# Patient Record
Sex: FEMALE | Race: BLACK OR AFRICAN AMERICAN | URBAN - METROPOLITAN AREA
[De-identification: names, ages, dates, MRNs, and addresses within clinical notes are randomized per-mention and may not be internally consistent; named-entity substitution may affect disease eponyms.]

---

## 2024-04-04 ENCOUNTER — TELEPHONE (OUTPATIENT)
Dept: TRANSPLANT | Facility: HOSPITAL | Age: 44
End: 2024-04-04
Payer: COMMERCIAL

## 2024-04-04 ENCOUNTER — DOCUMENTATION (OUTPATIENT)
Dept: TRANSPLANT | Facility: HOSPITAL | Age: 44
End: 2024-04-04
Payer: COMMERCIAL

## 2024-04-04 VITALS — WEIGHT: 162.04 LBS | BODY MASS INDEX: 27.66 KG/M2 | HEIGHT: 64 IN

## 2024-04-04 DIAGNOSIS — N18.6 ESRD (END STAGE RENAL DISEASE) (MULTI): Primary | ICD-10-CM

## 2024-04-04 NOTE — PROGRESS NOTES
Do you have difficulty reading or writing in English?  Yes, Reading(Pt.Legally Blind)  What is the primary cause of your kidney disease?  Diabetes  Are you currently on dialysis?   yes  If yes, what days do you have your dialysis treatments?  M, W, F  Have you received a transplant before?   no  If yes, what organ, and when and where was your transplant?     Have you been diagnosed with diabetes?    yes, not being treated.  Have you tested positive for hepatitis or HIV?   no  Have you ever been diagnosed with cancer?   no  If yes, what type of cancer, and when and where were you treated?     Do you have a history of a heart attack or stroke?   Yes, heart attack-2013, treated at [facility]  Are you currently or have you previously been seen by a mental health professional?   no  If yes, what is the name of your mental health provider?     Are you a current or former tobacco user?   yes, Quit 2018  Do you have history of alcohol abuse or dependence?   no  Do you have a history of illegal drug abuse or dependence?   no  Has anyone told you they're willing to donate their kidney to you?   yes  Comments:  Intake complete, scheduled .  Records being requested.

## 2024-05-20 ENCOUNTER — DOCUMENTATION (OUTPATIENT)
Dept: TRANSPLANT | Facility: HOSPITAL | Age: 44
End: 2024-05-20
Payer: COMMERCIAL

## 2024-05-20 NOTE — PROGRESS NOTES
Plan is [facility]  (Medicare-Medicaid Plan) out of area plan from [facility] in [place].

## 2024-05-22 ENCOUNTER — DOCUMENTATION (OUTPATIENT)
Dept: TRANSPLANT | Facility: HOSPITAL | Age: 44
End: 2024-05-22
Payer: COMMERCIAL

## 2024-05-23 ENCOUNTER — DOCUMENTATION (OUTPATIENT)
Dept: TRANSPLANT | Facility: HOSPITAL | Age: 44
End: 2024-05-23
Payer: COMMERCIAL

## 2024-05-23 ENCOUNTER — HOSPITAL ENCOUNTER (INPATIENT)
Age: 44
End: 2024-05-23
Attending: SURGERY | Admitting: SURGERY
Payer: COMMERCIAL

## 2024-05-23 NOTE — PROGRESS NOTES
Per [facility] O active.  Not able to verify Medicaid portion.  Will need to verify pts name on Medicaid card.  Listing approval will be needed.

## 2024-05-24 ENCOUNTER — DOCUMENTATION (OUTPATIENT)
Dept: TRANSPLANT | Facility: HOSPITAL | Age: 44
End: 2024-05-24

## 2024-05-24 DIAGNOSIS — Z13.6 ENCOUNTER FOR SCREENING FOR CARDIOVASCULAR DISORDERS: ICD-10-CM

## 2024-05-24 DIAGNOSIS — N18.6 END STAGE RENAL DISEASE (MULTI): ICD-10-CM

## 2024-05-24 DIAGNOSIS — N18.9 CHRONIC KIDNEY DISEASE, UNSPECIFIED CKD STAGE: ICD-10-CM

## 2024-05-24 DIAGNOSIS — Z79.899 OTHER LONG TERM (CURRENT) DRUG THERAPY: ICD-10-CM

## 2024-05-24 DIAGNOSIS — Z01.818 PRE-TRANSPLANT EVALUATION FOR KIDNEY TRANSPLANT: ICD-10-CM

## 2024-05-24 DIAGNOSIS — Z51.81 ENCOUNTER FOR THERAPEUTIC DRUG LEVEL MONITORING: ICD-10-CM

## 2024-05-24 NOTE — PROGRESS NOTES
Eval Clinic Note:  Patient attended evaluation appts on *** with  *** and  ***  Medications and allergies reviewed with patient.  Patient presented to appointment with ***.  Patient ambulated independently/used a wheelchair/cane***.    History:  Patient has a history of   Dialysis: *** on T-Th-Sat OR M-W-F, ACCESS ***, since ***.   PREDIALYSIS  ***.  Testing completed recently:   Testing needed for evaluation:   Listing Consent: ***KDPI >85%, DCD, Hep C, Hep B***  Comments:  Provided patient with my contact info for questions and concerns.      LISTING EDUCATION    Patient educated regarding the following prior to placement on the transplant waiting list:   The patient?s medical condition, prognosis, and treatment plan.   The expectations and patient responsibilities while on the waiting list, including:   Keeping the transplant center informed of any changes in contact information or insurance coverage   Notifying the transplant center of any changes in medical status   Required testing and/or re-evaluation appointments while awaiting transplant   An overview of the surgical procedure, including potential risks and alternatives.   Information regarding what to expect during the inpatient admission and recovery period.   A discussion regarding organ offers and types of potential donors, including potential risks that may be associated with specific types of donors that could affect the success of the transplant or the health of the patient.  The right to refuse transplantation.     Patient was given the opportunity to have questions answered. Patient was provided a copy of the informed consent for transplant listing.    Education provided by:  Transplant Coordinator: ***  Transplant Physician: ***    Signed listing informed consent received? YES/NO  Patient agrees to be listed for the following:  KDPI > 85% [***]  Donors After Circulatory Death (DCD) [***]  Donors with a Positive Core Antibody for Hepatitis B  [***]  Donors with Hepatitis C Virus to recipients with hepatitis C [***]  Donors with Hepatitis C Virus to Negative hepatitis C recipients [***]    Patient will be discussed at an upcoming selection committee to determine eligibility to be placed on the [facility] waiting list.

## 2024-05-24 NOTE — PROGRESS NOTES
Eval Clinic Note:  Patient attended evaluation appts on 2024 with Dr. Lewis and Dr. Jimenez. Medications and allergies reviewed with patient.  Patient presented to appointment with her significant other.  Patient ambulated independently.  History:  Patient has a history of nonhealing nonsurgical wound, PAD, MI, MONO, blindness, , obesity, T2DM  Dialysis:  [facility] ([place])  Testing completed recently: Mammogram 2024 (need records)  Testing needed for evaluation:  CT A/P non-contrast, TISHA/TBI (start with these two), CAC, echo, EKG, pap, pelvis ultrasound, colonoscopy, renasight (will need to be mailed), virtual finance, dietician, cardiology risk stratification  Listing Consent:  KDPI >85%, DCD, Hep C, Hep B - if immunce  Comments:  Provided patient with my contact info for questions and concerns.      Dr. Marion ([place]), vascular doctor, Dr. Syed ([place]), Dr. Garima Alfonso ([facility])    *Patient lives in [place]. Per Dr. Aaron, start with an TISHA/TBI and CT A/P non-contrast and then order the rest of the testing.        LISTING EDUCATION    Patient educated regarding the following prior to placement on the transplant waiting list:   The patient's medical condition, prognosis, and treatment plan.   The expectations and patient responsibilities while on the waiting list, including:   Keeping the transplant center informed of any changes in contact information or insurance coverage   Notifying the transplant center of any changes in medical status   Required testing and/or re-evaluation appointments while awaiting transplant   An overview of the surgical procedure, including potential risks and alternatives.   Information regarding what to expect during the inpatient admission and recovery period.   A discussion regarding organ offers and types of potential donors, including potential risks that may be associated with specific types of donors that could affect the success of the  transplant or the health of the patient.  The right to refuse transplantation.     Patient was given the opportunity to have questions answered. Patient was provided a copy of the informed consent for transplant listing.    Education provided by:  Transplant Coordinator: Hallie Valdez RN   Transplant Physician:  Zack Jimenez MD      Signed listing informed consent received? YES/NO  Patient agrees to be listed for the following:  KDPI > 85% [X]  Donors After Circulatory Death (DCD) [X]  Donors with a Positive Core Antibody for Hepatitis B - if immune [X]  Donors with Hepatitis C Virus to recipients with hepatitis C []  Donors with Hepatitis C Virus to Negative hepatitis C recipients [X]    Patient will be discussed at an upcoming selection committee to determine eligibility to be placed on the [facility] waiting list.

## 2024-05-28 ENCOUNTER — OFFICE VISIT (OUTPATIENT)
Dept: TRANSPLANT | Facility: HOSPITAL | Age: 44
End: 2024-05-28
Payer: COMMERCIAL

## 2024-05-28 ENCOUNTER — APPOINTMENT (OUTPATIENT)
Dept: TRANSPLANT | Facility: HOSPITAL | Age: 44
End: 2024-05-28
Payer: COMMERCIAL

## 2024-05-28 ENCOUNTER — LAB (OUTPATIENT)
Dept: LAB | Facility: LAB | Age: 44
End: 2024-05-28
Payer: COMMERCIAL

## 2024-05-28 ENCOUNTER — LAB REQUISITION (OUTPATIENT)
Dept: LAB | Facility: CLINIC | Age: 44
End: 2024-05-28
Payer: COMMERCIAL

## 2024-05-28 ENCOUNTER — SOCIAL WORK (OUTPATIENT)
Dept: TRANSPLANT | Facility: HOSPITAL | Age: 44
End: 2024-05-28
Payer: COMMERCIAL

## 2024-05-28 VITALS
HEART RATE: 83 BPM | OXYGEN SATURATION: 100 % | WEIGHT: 159.8 LBS | SYSTOLIC BLOOD PRESSURE: 124 MMHG | BODY MASS INDEX: 27.28 KG/M2 | TEMPERATURE: 98 F | DIASTOLIC BLOOD PRESSURE: 84 MMHG

## 2024-05-28 VITALS
DIASTOLIC BLOOD PRESSURE: 84 MMHG | HEART RATE: 83 BPM | HEIGHT: 65 IN | OXYGEN SATURATION: 100 % | WEIGHT: 159.8 LBS | SYSTOLIC BLOOD PRESSURE: 124 MMHG | TEMPERATURE: 98 F | BODY MASS INDEX: 26.62 KG/M2

## 2024-05-28 DIAGNOSIS — Z01.818 PRE-TRANSPLANT EVALUATION FOR KIDNEY TRANSPLANT: ICD-10-CM

## 2024-05-28 DIAGNOSIS — N18.6 END STAGE RENAL DISEASE (MULTI): ICD-10-CM

## 2024-05-28 DIAGNOSIS — N18.6 ESRD NEEDING DIALYSIS (MULTI): Primary | ICD-10-CM

## 2024-05-28 DIAGNOSIS — Z13.6 ENCOUNTER FOR SCREENING FOR CARDIOVASCULAR DISORDERS: ICD-10-CM

## 2024-05-28 DIAGNOSIS — N18.9 CHRONIC KIDNEY DISEASE, UNSPECIFIED CKD STAGE: ICD-10-CM

## 2024-05-28 DIAGNOSIS — N18.6 ESRD (END STAGE RENAL DISEASE) (MULTI): Primary | ICD-10-CM

## 2024-05-28 DIAGNOSIS — Z51.81 ENCOUNTER FOR THERAPEUTIC DRUG LEVEL MONITORING: ICD-10-CM

## 2024-05-28 DIAGNOSIS — Z79.899 OTHER LONG TERM (CURRENT) DRUG THERAPY: ICD-10-CM

## 2024-05-28 DIAGNOSIS — Z99.2 ESRD NEEDING DIALYSIS (MULTI): Primary | ICD-10-CM

## 2024-05-28 LAB
ABO GROUP (TYPE) IN BLOOD: NORMAL
ALBUMIN SERPL BCP-MCNC: 4.5 G/DL (ref 3.4–5)
ALP SERPL-CCNC: 78 U/L (ref 33–110)
ALT SERPL W P-5'-P-CCNC: 9 U/L (ref 7–45)
AMYLASE SERPL-CCNC: 102 U/L (ref 29–103)
AST SERPL W P-5'-P-CCNC: 12 U/L (ref 9–39)
BILIRUB DIRECT SERPL-MCNC: 0.1 MG/DL (ref 0–0.3)
BILIRUB SERPL-MCNC: 0.4 MG/DL (ref 0–1.2)
BUN SERPL-MCNC: 36 MG/DL (ref 6–23)
C PEPTIDE SERPL-MCNC: 8.5 NG/ML (ref 0.7–3.9)
CHOLEST SERPL-MCNC: 138 MG/DL (ref 0–199)
CHOLESTEROL/HDL RATIO: 2.7
CMV IGG AVIDITY SERPL IA-RTO: REACTIVE %
CREAT SERPL-MCNC: 7.75 MG/DL (ref 0.5–1.05)
EBV EA IGG SER QL: NEGATIVE
EBV NA AB SER QL: POSITIVE
EBV VCA IGG SER IA-ACNC: POSITIVE
EBV VCA IGM SER IA-ACNC: NEGATIVE
EGFRCR SERPLBLD CKD-EPI 2021: 6 ML/MIN/1.73M*2
ERYTHROCYTE [DISTWIDTH] IN BLOOD BY AUTOMATED COUNT: 14.7 % (ref 11.5–14.5)
EST. AVERAGE GLUCOSE BLD GHB EST-MCNC: 94 MG/DL
FLOW AUTOCROSSMATCH: NORMAL
HBA1C MFR BLD: 4.9 %
HBV CORE AB SER QL: NONREACTIVE
HBV SURFACE AB SER-ACNC: >1000 MIU/ML
HBV SURFACE AG SERPL QL IA: NONREACTIVE
HCT VFR BLD AUTO: 38.5 % (ref 36–46)
HCV AB SER QL: NONREACTIVE
HCYS SERPL-SCNC: 35.57 UMOL/L (ref 5–13.9)
HDLC SERPL-MCNC: 51.2 MG/DL
HGB BLD-MCNC: 12.3 G/DL (ref 12–16)
HIV 1+2 AB+HIV1 P24 AG SERPL QL IA: NONREACTIVE
HLA CLASS I AB SCREEN,FC: NORMAL
HLA CLASS II AB SCREEN,FC: NORMAL
HLA CLS I TYP PNL BLD/T DONR HIGH RES: NORMAL
HLA RESULTS: NORMAL
HLA-DP2 QL: NORMAL
HLA-DQB1 HIGH RES: NORMAL
HLA-DRB1 HIGH RES: NORMAL
INR PPP: 1 (ref 0.9–1.1)
MCH RBC QN AUTO: 30.3 PG (ref 26–34)
MCHC RBC AUTO-ENTMCNC: 31.9 G/DL (ref 32–36)
MCV RBC AUTO: 95 FL (ref 80–100)
NON-HDL CHOLESTEROL: 87 MG/DL (ref 0–149)
NRBC BLD-RTO: 0 /100 WBCS (ref 0–0)
PHOSPHATE SERPL-MCNC: 4.9 MG/DL (ref 2.5–4.9)
PLATELET # BLD AUTO: 120 X10*3/UL (ref 150–450)
PROT SERPL-MCNC: 8.9 G/DL (ref 6.4–8.2)
PROTHROMBIN TIME: 10.9 SECONDS (ref 9.8–12.8)
RBC # BLD AUTO: 4.06 X10*6/UL (ref 4–5.2)
RH FACTOR (ANTIGEN D): NORMAL
TREPONEMA PALLIDUM IGG+IGM AB [PRESENCE] IN SERUM OR PLASMA BY IMMUNOASSAY: REACTIVE
VARICELLA ZOSTER IGG INDEX: 6.2 IA
VZV IGG SER QL IA: POSITIVE
WBC # BLD AUTO: 10.2 X10*3/UL (ref 4.4–11.3)

## 2024-05-28 PROCEDURE — 87340 HEPATITIS B SURFACE AG IA: CPT

## 2024-05-28 PROCEDURE — 85610 PROTHROMBIN TIME: CPT

## 2024-05-28 PROCEDURE — 87389 HIV-1 AG W/HIV-1&-2 AB AG IA: CPT

## 2024-05-28 PROCEDURE — 86780 TREPONEMA PALLIDUM: CPT

## 2024-05-28 PROCEDURE — 86318 IA INFECTIOUS AGENT ANTIBODY: CPT

## 2024-05-28 PROCEDURE — 82465 ASSAY BLD/SERUM CHOLESTEROL: CPT

## 2024-05-28 PROCEDURE — 80323 ALKALOIDS NOS: CPT

## 2024-05-28 PROCEDURE — 80349 CANNABINOIDS NATURAL: CPT

## 2024-05-28 PROCEDURE — 86706 HEP B SURFACE ANTIBODY: CPT

## 2024-05-28 PROCEDURE — 86803 HEPATITIS C AB TEST: CPT

## 2024-05-28 PROCEDURE — 84520 ASSAY OF UREA NITROGEN: CPT

## 2024-05-28 PROCEDURE — 86825 HLA X-MATH NON-CYTOTOXIC: CPT | Mod: OUT | Performed by: SURGERY

## 2024-05-28 PROCEDURE — 99213 OFFICE O/P EST LOW 20 MIN: CPT | Performed by: STUDENT IN AN ORGANIZED HEALTH CARE EDUCATION/TRAINING PROGRAM

## 2024-05-28 PROCEDURE — 86787 VARICELLA-ZOSTER ANTIBODY: CPT

## 2024-05-28 PROCEDURE — 86900 BLOOD TYPING SEROLOGIC ABO: CPT

## 2024-05-28 PROCEDURE — 81382 HLA II TYPING 1 LOC HR: CPT | Mod: 59,OUT | Performed by: SURGERY

## 2024-05-28 PROCEDURE — 82150 ASSAY OF AMYLASE: CPT

## 2024-05-28 PROCEDURE — 80076 HEPATIC FUNCTION PANEL: CPT

## 2024-05-28 PROCEDURE — 86644 CMV ANTIBODY: CPT

## 2024-05-28 PROCEDURE — 86663 EPSTEIN-BARR ANTIBODY: CPT

## 2024-05-28 PROCEDURE — 86901 BLOOD TYPING SEROLOGIC RH(D): CPT

## 2024-05-28 PROCEDURE — 99215 OFFICE O/P EST HI 40 MIN: CPT | Performed by: INTERNAL MEDICINE

## 2024-05-28 PROCEDURE — 83036 HEMOGLOBIN GLYCOSYLATED A1C: CPT

## 2024-05-28 PROCEDURE — 99203 OFFICE O/P NEW LOW 30 MIN: CPT | Performed by: STUDENT IN AN ORGANIZED HEALTH CARE EDUCATION/TRAINING PROGRAM

## 2024-05-28 PROCEDURE — 84681 ASSAY OF C-PEPTIDE: CPT

## 2024-05-28 PROCEDURE — 85027 COMPLETE CBC AUTOMATED: CPT

## 2024-05-28 PROCEDURE — 36415 COLL VENOUS BLD VENIPUNCTURE: CPT

## 2024-05-28 PROCEDURE — 82565 ASSAY OF CREATININE: CPT

## 2024-05-28 PROCEDURE — 86704 HEP B CORE ANTIBODY TOTAL: CPT

## 2024-05-28 PROCEDURE — 84100 ASSAY OF PHOSPHORUS: CPT

## 2024-05-28 PROCEDURE — 80307 DRUG TEST PRSMV CHEM ANLYZR: CPT

## 2024-05-28 PROCEDURE — 86481 TB AG RESPONSE T-CELL SUSP: CPT

## 2024-05-28 PROCEDURE — 86665 EPSTEIN-BARR CAPSID VCA: CPT

## 2024-05-28 PROCEDURE — 86664 EPSTEIN-BARR NUCLEAR ANTIGEN: CPT

## 2024-05-28 PROCEDURE — 83718 ASSAY OF LIPOPROTEIN: CPT

## 2024-05-28 PROCEDURE — 83090 ASSAY OF HOMOCYSTEINE: CPT

## 2024-05-28 ASSESSMENT — PAIN SCALES - GENERAL
PAINLEVEL: 0-NO PAIN
PAINLEVEL: 0-NO PAIN

## 2024-05-28 NOTE — PROGRESS NOTES
TRANSPLANT NEPHROLOGY CONSULT :   KIDNEY TRANSPLANT RECIPIENT EVALUATION        SERVICE DATE: 05/28/2024     REASON FOR CONSULT/CHIEF COMPLAINT:    FOR KIDNEY TRANSPLANT RECIPIENT EVALUATION.    HPI:    Ms. Babatunde Short is a 44 y.o. female with past medical history significant for ESRD sec to DM2, on HD since 2/7/2014 ([facility], Dr. Bobbi Syed, [place], via LUE AVG), h/o peripheral vascular disease with prior h/o non-healing LE wounds s/p PCI to LLE 2/2024 (Dr. Marion), CAD h/o PCI x1 2013, on Aspirin and Plavix, MONO (no recent sleep study), diabetic retinopathy who is here for pre-txp eval.     Pt was diagnosed with DM in 1999, complicated by neuropathy, nephropathy, retinopathy. HTN dx in 1999. Long h/o poorly controlled DM, HTN.     Previously was referred to [place], [place] in 2014 for kidney txp but pt did not pursue eval due to various reasons. She was again referred to [place], [place] 2022. She was deemed not a suitable candidate due to peripheral vascular disease. Pt tried to pursue txp eval at [place], [place], as well but could not due to insurance reasons.     Pt recently underwent LLE angiogram and PCI 2/2024 following which her LE wounds have healed completely and pt reports increased exercise capability.     Today, pt states she feels well overall. Reports recent vascular procedure has made her feel considerably better. Able to walk upto 2 blocks w/o any difficulty. Can climb 2 flights of stairs. In a wheel chair today due to long distance ambulation.    Able to perform all ADLs and IADLs independently.    Accompanied by her . Has potential donors- sister (28y) and a friend of her daughter.     Quit smoking 3 yrs ago. Uses marijuana daily as appetite stimulant. Denies any other substance abuse.     Family history notable for multiple members with DM, HTN, mother and aunt with colon Ca.     Denies any specific complaints today. ROS +ve for menorrhagia, was  advised to schedule a pelvic US.    Prior h/o blood transfusion x2. Has 3 biological children.    Makes very little urine currently.     The patient is here for kidney transplant recipient evaluation. Ms. Babatunde Short has had multiple complications from end stage severe renal disease including anemia, secondary hyperparathyroidism, and osteodystrophy. The patient is here today for an evaluation for kidney transplantation to improve quality of life and decrease the risk of cardiovascular disease, coronary artery disease and stroke.     Denied chest pain, shortness of breath, palpitation, dyspnea on exertion, dysuria, fever, nausea, vomiting, diarrhea and flu-liked symptoms. No swelling of the extremities.     ROS:  Review of  14 systems was performed system by system. See HPI. Otherwise, the symptoms were negative.    PAST MEDICAL HISTORY: Please see HPI.    No past medical history on file.     PAST SURGICAL HISTORY: Please see HPI.    No past surgical history on file.     SOCIAL HISTORY: Please see our 's note for details.    Social History     Socioeconomic History    Marital status: Unknown     Spouse name: Not on file    Number of children: Not on file    Years of education: Not on file    Highest education level: Not on file   Occupational History    Not on file   Tobacco Use    Smoking status: Not on file    Smokeless tobacco: Not on file   Substance and Sexual Activity    Alcohol use: Not on file    Drug use: Not on file    Sexual activity: Not on file   Other Topics Concern    Not on file   Social History Narrative    Not on file     Social Determinants of Health     Financial Resource Strain: Not on file   Food Insecurity: Not on file   Transportation Needs: Not on file   Physical Activity: Not on file   Stress: Not on file   Social Connections: Not on file   Intimate Partner Violence: Not on file   Housing Stability: Not on file       FAMILY HISTORY:  No family history on file.    MEDICATION  "LIST:  No current outpatient medications    ALLERGY  Not on File    PHYSICAL EXAM:    Visit Vitals  /84   Pulse 83   Temp 36.7 °C (98 °F) (Temporal)   Wt 72.5 kg (159 lb 12.8 oz)   SpO2 100%   BMI 27.28 kg/m²   BSA 1.81 m²        General Appearance - NAD, Good speech, oriented and alert, in wheel chair  HEENT - Supple. Not pale. No jaundice. No cervical lymphadenopathy. Pharynx and tonsils are not injected.  CVS - RRR. Normal S1/S2. No murmur, click , rub or gallop  Lungs- clear to auscultation bilaterally  Abdomen - soft , not tender, no guarding, no rigidity. No hepatosplenomegaly. Musculoskeletal /Extremities - no edema. Full ROM. No joint tenderness.   Neuro/Psych - appropriate mood and affect. Motor power V/V all extremities. CN I -XII were grossly intact.  Skin - No visible rash  Dialysis access : LUE AVG is clean, dry and intact. No signs of infection.    LABS:    No results found for: \"WBC\", \"HGB\", \"HCT\", \"PLT\", \"CHOL\", \"TRIG\", \"HDL\", \"LDLDIRECT\", \"ALT\", \"AST\", \"NA\", \"K\", \"CL\", \"CREATININE\", \"BUN\", \"CO2\", \"TSH\", \"PSA\", \"INR\", \"GLUF\", \"HGBA1C\", \"ALBUR\"  par    EKG:  No results found for this or any previous visit (from the past 4464 hour(s)).    Echocardiogram:   No results found for this or any previous visit from the past 1825 days.      Cardiac Catheterization:   No results found for this or any previous visit from the past 1825 days.  No results found for this or any previous visit from the past 3650 days.       ASSESSMENT AND PLAN:    Pt may prove to be a reasonable candidate pending further eval.     Will get ABIs, CT abd/pelvis to assess vascular calcification burden.    Will need sleep study given unclear status of MONO. Follow up with gyn for menorrhagia, pt to set up pelvic US.    Cardiac testing per protocol- echo, stress test, calcium score. Will need cardiology follow for risk assessment and optimization.     Has potential donors (sister and daughter's friend). Will get CKD gene panel. " Reports having adequate support system. Needs thorough psychosocial eval given prior h/o non-adherence (now much improved).    Needs updated cancer screening per age/sex.   Rest of work up per protocol.    The case will be presented at the selection committee at the [facility] [place].  The final decision from the committee will be sent out to notify the patient/primary care physician/ nephrologist. The above recommendations were discussed with the patient at length.     In addition, the following were also discussed:    - Risks and benefits of transplantation, both short-term and long-term    - Risk of primary graft non-function, DGF, SGF, rejection, primary disease recurrence, return to dialysis    - Risks of immunosuppression including infections, CA, CV risk    - Need for compliance with medications and medical care in general    - We reviewed the necessity of HBV vaccination and other recommended vaccines before a kidney transplant, following the Centers for Disease Control and Prevention(CDC)'s guidelines [https://www.cdc.gov/vaccines/schedules/downloads/adult/adult-combined-schedule.pdf]     Currently, the patient has received the following vaccines:      There is no immunization history on file for this patient.      The patient expressed understanding of the above and wishes to proceed.  I answered all of his questions. I urged the patient to look for living donors.    - I have spent over 60 minutes with the patient, reviewing medical record, lab result , CXR result and other specialty's notes. More than 50% of the time was spent in counseling, explaining about the transplantation and answering the questions. I also reviewed the medical record, blood test results, imaging and previous studies which were obtained from the nephrologists.

## 2024-05-28 NOTE — PROGRESS NOTES
"                    Kidney Transplant Evaluation Office Visit    Chief Complaint: Patient presents for kidney transplant evaluation    History of Present Illness:  Valente Short  is a 44 y.o. female presents with ESRD from Type 2 DM and hypertension. She started HD in 2014 and is currently undergoes HD 3 times a week via a LUE AVG. She is anuric for about 4 years now.    Additional medical history includes peripheral vascular disease, nonhealing leg wounds in past, MI, MONO, diabetic retinopathy.    She recently underwent (2/2024 in [place]) a left lower extremity angiogram and stent placement for chronic leg wounds and claudication - unclear of anatomic location exactly based on history - and no procedure or clinic notes available to review. She is now on Aspirin and Plavix. Her wounds have healed and she is able to walk upto 2 blocks after the angioplasty.    She is a past smoker and quit 3y ago.     In clinic today, she denies any chest pain, SOB, palpitations, as well as any recent fever, abdominal pain, difficulty voiding or other urinary symptoms, constipation, or poor oral intake.    She has been recently turned down for a KT listing at [place] due to \"calcifications in my blood vessels\" as reported by her in clinic today. However documentation via care everywhere shows \"PVOD\" as a reason - although she is asymptomatic and does not report any knowledge of having any pulmonary issues.      Hemodialysis: 3 times a week  Dialysis Access: LUE AVG  Urine Status: oliguric.   Disease Etiology: diabetic nephropathy and hypertensive nephropathy.   Disease Complications: dyslipidemia and hypertension.   PVD: YES - history of claudication, ulcers, and recent angioplasty  Prior Abdominal Surgery: YES - C-sections   Prior Malignancy: NO   BMI: Body mass index is 26.59 kg/m².  Potential Living Donors: YES - sister    Review of Systems:  Cardiac: Denies chest pain, palpitations  : Anuric. Denies history of " gross hematuria, nephrolithiasis, urinary retention, or recurrent UTIs.  Vascular: Denies personal or familial history of DVT/PE. No active claudication or non-healing LE wounds.  Extremities: LE Edema -   Functional Status: Can walk up 2 flights of stairs    Past Medical History:  ESRD on HD  HTN  DM2  PVD  [place]  MONO    Past Surgical History:  LUE AVG   x3    Social History:  Lives with  in [place]  Quit smoking 3y ago  No etoh    Transfusions: Yes 2 in   Pregnancy:  3, C-sections  Prior transplant: No    Family History:  Mother: n/a  Father: n/a  Sibling: n/a    Physical Exam:  Vitals:    24 1340   BP: 124/84   Pulse: 83   Temp: 36.7 °C (98 °F)   SpO2: 100%     Gen: A+OX3; NAD  HEENT: PERRL, sclera anicteric, MMM  Cardiac: RRR  Chest: Normal inspiratory effort  Abdomen: S/NT/ND.  Ext: No LE edema  Vascular:  well perfused extremities, no obvious wounds  Psychiatric: Normal mood, affect    Assessment/Plan:  - The patient is may be a potential candidate for kidney transplantation pending complete work-up  - Routine age/gender based screening  - Cardiac testing per protocol: ECHO/stress test MRI/ Cardiac score  - Non-contrast CT scan abdomen/pelvis  - TISHA, Obtain angiogram/ angioplasty records from [place]  - Acceptable functional status    Would obtain CT and TISHA first to assess PVD burden, before proceeding for completion cardio-pulmonary work-up    Surgical Considerations:  Symptomatic PVD  Assess CT AP as soon as completed    Transplant Education:    I had a discussion with this patient regarding 1 year graft and patient survival statistics following renal transplantation for both living and  donor allograft recipients. This data included [facility] data compared to National data readily available for review on https://www.SRTR.org. The patient also had attended the kidney transplant education class provided by the transplant institute.     The difference between  allograft function was discussed comparing living donor, KDPI 0-85%, and >85% kidneys.     Further discussion included:  -The transplant selection committee process.  -The need for lifelong immunosuppressive therapy, and the side effects of these medications including the risk of infections, cancer, and lymphoma.  -The wait list time approximately is 5 years or more for  donor transplants and the statistical superiority of a living donor.     -Using identified donors with risk criteria for transmission of infection  -The possibility of utilizing  donors with known HCV antibody and/or ROBINSON positivity and post-transplant treatment/surveillance protocol  -Potential transmission of infectious disease from any  donors, as well as living donors.   -The possibility of transmission of tumors and infections via the transplanted organ.  -The inability to completely test for all potential harmful tumors or infectious agents.  -The possibility of listing at multiple locations.     Surgical complications including need for reoperation(s) including but not limited to:  -Bleeding.  -Repair of leaks.  -Control of infection.  -Blood clots in the transplant vessels.  -Possible kidney transplant removal.     The medical complications including but not limited to:  -Death.  -Cardiac.  -Pulmonary.  -Infectious.  -Neurologic.  -Other Complications.     We also discussed how the kidney transplant could function:  -Non-function and possible kidney transplant removal within the first 3 to 6 months.  -Delayed graft function (dialysis needed after transplant).  -The potential of recurrence of kidney disease leading to kidney transplant graft loss.    Time Attestation:  I spent 60 minutes with the patient, over 50 minutes in counseling and education as outlined above.    Alejandro Lewis MD, [facility]  Transplant & Hepatobiliary Surgery

## 2024-05-28 NOTE — PROGRESS NOTES
"ENCOUNTER    Visit Type Initial Visit  Location: [place] [address]    Barriers to Communication / Understanding:   [] Language [] Vision [] Hearing [] Other      []  Present     Accompanied By: Austen Lujan; Daughter, Barry present to confirm commitment as support     Organ For Transplant:  Kidney    Ethnicity:  Black Or     ADLs Fully Independent      Instrumental ADLs Fully Independent      Level of Activity Sedentary      DME: Denied     Knowledge of Health Good  HTN    Why Do You Have End Stage Organ Disease   Pt reported the cause of her kidney disease is diabetes.     Knowledge of Transplant / VAD:  Yes Patient Is Able To Make An Informed Decision    Patient Understands the Risks of Transplant / VAD  Yes Rejection  Yes Infection Yes Complications  Yes Death    Patient Understands Recovery and Follow-Up from Transplant / VAD  Yes Length Of State Yes Appointments  Yes Labs  Yes Rehabilitation    Patient Has Identified Goals of Transplant / VAD Yes  Pt reported a goal of transplant is \"to be able to travel and not feel tied down.\"    Any Potential Donors?     Overall Compliance  Good     Pt reported she takes 9 medications daily.     Compliance With Medications Good    Managed By Other Daughter  Pillbox - Pt reported she could manage her own medications if needed.     Understanding Of Medication  Good  Compliance With Appointments Good    How Does Patient Handle Health Problems      Organ  Kidney    IOP:     Fluid Restriction:   No [] Compliant     Medical And Clinic Appointment Compliant     Dialysis:  [x] What Dialysis Center   Critical access hospitalius [x] Began   10 years      [] In Center [] Home Hemo [] Peritoneal       Attendance:  Treatment Attendance Good Treatment Time M, W, F - Runs for 3.5 hours    [] Shortened Treatments [] Rescheduled Treatments [] Missed Treatments   Pt reported she will only shorten or miss treatments if the facility is having problems with their water. "     Fluids:     Does Patient Still Urinate  [] Fluid Restriction [] IDWG [] EDW  Achieved Dry Weight        Diet:   Patient is compliant with renal restrictions     Patient is compliant with low sodium diet      Labs:    [] Patient Reported [] Collateral       []  Potassium [] Albumin [] Phosphorus      # of Binders:  [x] # of Binders per meal   2 [x] Meals per day   2      [x]  # of Binders per Snack   1-2 [x] Snacks per day   6 [] Phosphorus     Pancreas:  [] Checks blood sugar      times/day [] A1c   Hypoglycemic Episodes  Unawareness  Outside Interventions    Liver:  Is Lactulose prescribed  Dose:   Timesper day:  Is patient compliant       SOCIAL HISTORY  No       Education:  education: HS Diploma    Literate Yes  Pt reported she cannot read well due to being legally blind.   Computer literate Yes  Internet access Yes       Sources of Income: SSDI ($564 monthly), SNAP ($330 monthly), child support ($600 monthly)  Patient's Current Employment    [] Full-Time [] Part-Time [] Unemployed [] Retired     []  None [] Not working by choice [x] Not working disabled     [] Short Term Leave   [] Other   Employment History     Will patient have paid status from employment during recovery     Spouse / SO Current Employment     Will spouse / SO have paid status from employment during recovery     Other Sources of Income Total Household Income $17,900 yearly      Does patient have financial concerns   Yes     Is patient able to meet current monthly expenses   Yes    Resources:  Housing Assistance [place] Housing    Patient was provided information on transplant fundraising       Insurance:  Primary Insurance [employer] [facility]    Secondary Insurance     Prescription Coverage Copay cost per month $30    Medicare coverage due to     Medicare Part A  Effective date    Medicare Part B  Effective date    Medicare Part C  Deductable  Out of Pocket Max    Medicare Part D  Extra Help?    Medicaid  Waiver  Redetermination  "Date    O       FAMILY SYSTEM    Single     How long   Describe Relationship    How long   Describe Relationship    When     When  In a Relationship Yes  How Long 10 years Describe Relationship \"Wonderful\"  (Engaged)    Spouse / SO Name Austen  Age 52  Health  \"Pretty good\"  Other Caregiver Responsibilities  Spouse / Significant others reaction to donation    Children:  Yes # Biological (2 daughters, 1 son)   # Adopted    # Step Children        Child #1 Name Hardik  Age 24  Health \"Pretty good\"    Lives Local  How Much Contact Daily    Child #2 Name Barry  Age 21  Health \"Wonderful\"    Lives With patient  How Much Contact Daily    Child #3 Name Maeve Age 17 Health \"Pretty good\"    Lives With patient  How Much Contact Daily    Parents:  Raised By One Biological Parent Mother    Did the patient have contact with the other parent   Yes    Mother  Yes Age 61  Cause of Death Cancer  Father  Yes Age Unsure  Cause of Death Lupus, emphysema    Living Parent #1  Living Parent #2    Additional Information    Siblings:  [x] # Biological (1 brother, 2 sisters) [] # Half Siblings [] # Step Siblings     Sibling #1 Kris, lives local, daily contact  Sibling #2 Murtaza, lives local, daily contact  Sibling #3 Ami, lives local, daily contact    Support & Recovery Plan:  Yes Adequate    Primary Support:  Name Austen Phone [phone]  Age 52  Relationship to Patient Fiance  If employed, can they take time off work Not working   If so, is it paid time off    If not, will this impact your finances    Did they attend education classes Yes   Do they have other caregiver responsibilities (child or eldercare) No   Do they have their own conditions which may prevent them from providing care for you No  (Medical, psychological, physical limitations)    Are they available on short notice Yes   Are they reliable Yes   Are they responsible Yes   Are they able to understand and " "process new information Yes   Do they have reliable transportation or will you allow them to use your vehicle Yes   Are they currently involved in your care Yes   Comments    Secondary Support:  Name Barry Phone [phone]    Age 21  Relationship to Patient Daughter  If employed, can they take time off work Yes   Paid to help through work  If so, is it paid time off    If not, will this impact your finances    Did they attend education classes Yes   Do they have other caregiver responsibilities (child or eldercare) No   Do they have their own conditions which may prevent them from providing care for you No  (Medical, psychological, physical limitations)    Are they available on short notice Yes   Are they reliable Yes   Are they responsible Yes   Are they able to understand and process new information Yes   Do they have reliable transportation or will you allow them to use your vehicle Yes   Are they currently involved in your care Yes   Comments    Alternate Support  Alternate Support    Housing:  Yes Adequate Rents home  Type of Home House  Distance to [facility] 4 hours  Pets 1 dog  Does Patient Feel Safe in Home Yes    Transportation:  Yes Adequate  # Licensed Drivers in the Home 1  Does Patient Drive No If not, why Legally blind  # Reliable Vehicles 2  Does Patient use Public Transportation No  Does Patient use Medical Transportation Yes  Comments      MENTAL HEALTH    Cognition:  No impairment observed / reported    The patient reports their mood as \"pretty good.\"    Reported Mental Health Diagnosis   Denied  Family History of Mental Health Concerns   Bipolar disorder and depression   What are patient psychosocial stressors   Pt reported problems with her SSI as a current stressor.     Current Medications:  No  Mental Health Meds  Denied  Rx'd by   Sleep Meds  Denied  Rx'd by   Pain Meds  Denied  Rx'd by     OTC Meds   Aspirin, probiotics  Past Medications   Denied (Prescribed Zoloft in the past but did not " take)    Counseling Never  SW provided MH resources.     Has patient ever been hospitalized for mental health reasons No   Was the hospitalization voluntary  Duration   Where    When  Describe situation    Discharge Plan for Follow Up  Was Discharge Plan Completed   Referral to Transplant Psych   No  Mental Health Follow Up Required      Suicide Assessment:  History of Suicide Ideation No  [] Timeframe [] Frequency   Frequency   Plan Created  Intent to Follow Through  Outcome      History of Suicide Attempt No     History of Suicidal Ideation in the past 3 months No   Intensity   Duration     Description of Plan      Plans thought of  Intent to Follow Through  Highest Level of Intent to Follow Through    Current Plan for Safety    Plan for Follow-Up    Patient's Reported Trauma History   Pt reported a history of trauma.     What are patient's coping behaviors   Pt reported meditation and listening to music as coping behaviors.     Christian / Spirituality   Spiritual    Attitude toward interviewer Cooperative and Appropriate    Eye Contact Patient maintained good eye contact throughout appointment    Appearance The patient was neatly groomed, appropriately dressed and adequately nourished    Affect Appropriate    Thought Process Appropriate    Substance Use /Abuse History:    Current Tobacco User No  Patient uses   Tobacco Frequency   For How Long  Is Patient Required to Quit     Former Tobacco User Yes  Describe past tobacco use and date quit  Pt reported she stopped smoking Black and Milds 3 years ago. Pt shared she smoked for 3 years and would smoke 1 a day.     Current Alcohol User No  Type of Alcohol Used   Amount  Frequency   Pattern of Alcohol Use    Is Patient Required to Quit   Continued to use the substance despite being told the substance is affecting their health    History of problems at work, school or home due to substance use      Former Alcohol User Yes  Describe past alcohol use and date quit  Pt  reported her last alcohol use was on her birthday (March). Pt shared her last use before her birthday was 3 years ago. Pt reported she usually does not drink but if she does she will have 2 cocktails in a sitting.     Has patient ever gone to CD treatment   If yes, When, Where and What type of Program  Attends AA meetings    Sponsor  Do support people drink alcohol   If yes, describe support people's use  Is alcohol kept in the home   Does Patient need to sign a CD contract     Current Illegal / Unprescribed Drug User Yes  Type of Illegal Drug Used Marijuana  Frequency   Daily  Pattern of Drug Use  Pt reported she currently smokes marijuana a couple times a day. Pt shared she will smoke 2 blunts a day. Pt reported she has been smoking for 5 years.      Is Patient Required to Quit     Illegal / Unprescribed Drug #2  Type of Illegal Drug Used   Frequency  Pattern of Drug Use      Continue to use the substance despite being told the substance is affecting their health    History of problems at work, school or home due to substance use      Former Illegal / Unprescribed Drug User Yes  Describe past illegal drug use and date quit  Pt reported she has used marijuana edibles in the past.     Has patient ever gone to CD treatment   If yes, When, Where and What type of Program   Attends AA/NA  meetings    Is patient on a Methadone / Suboxone regiment   Do support people use illegal drugs   If yes, describe support people's use  Are illegal drugs kept in the home   Does Patient need to sign a CD contract     Illegal / Unprescribed Drug #2  Type of Illegal Drug Used   Frequency  Pattern of Drug Use    Prescription Drug Abuse:  No Has patient experienced feelings of addiction  No Has patient experienced symptoms of withdrawal  No Has patient experienced any side effects? e.g.  hallucinations or delusions    Does Patient Meet the Criteria for Alcohol Use Disorder No Diagnosis  Does Patient Meet OSOTC guidelines No  Does Patient  "Meet the Criteria for Illegal Drug Use Disorder No Diagnosis  Does Patient Meet OSOTC guidelines No    OSOTC Substance Relapse Risk Factors   DSM-5 Severity Factors:     IOP     LEGAL ISSUES  No Arrests  No Currently probation or parole No   assisted No  When   How long   Where       Citizenship:  Yes [place] Citizen   Green Card  Visa    Advance Directives: No  HCPOA   INÉS provided documents.     Guardian:    JEANETTE CONTE met with Pt and Pt's Austen bonds, for initial psychosocial assessment. Pt was pleasant and engaged. Pt reported she has [employer] for insurance. Pt demonstrated a good understanding of the risks of transplant and recovery process. Pt reported she has financial concerns at this time. Pt reported the cause of her kidney disease is diabetes. Pt reported a goal of transplant is \"to be able to travel and not feel tied down.\" Pt reported good compliance with medications, appointments, and dialysis treatments. Pt shared she does not manage her own medications at this time. Pt listed her Austen bonds, as primary support and her daughter, Barry, as secondary support. Pt reported both supports are adequate. Pt reported her mood as \"pretty good.\" Pt shared she has been prescribed MH medications in the past, but she has not taken them. Pt denied any other MH history. Pt scored a 0 on the PHQ-9, indicating no clinical depression. Pt scored a 1 on the ED-7, indicating minimal clinical anxiety. Pt denied any current tobacco or alcohol use. Pt reported she stopped smoking Black and Milds 3 years ago. Pt shared she smoked for 3 years and would smoke 1 a day. Pt reported her last alcohol use was on her birthday (March) and her last use before that was 3 years ago. Pt shared she will have 2 cocktails in a sitting. Pt reported she currently smokes 2 blunts a day. Pt shared she has been smoking for 5 years and has used edibles in the past. Pt scored an 8 on the SIPAT, indicating Pt is a good " candidate for transplant. SW would recommend listing Pt while monitoring Pt's identified risk factors. Pt's risk factors include Pt's financial situation and Pt's limited involvement in managing her medications.     PLAN  SW to meet with Pt annually.

## 2024-05-29 LAB
FLOW AUTOCROSSMATCH: NORMAL
HLA RESULTS: NORMAL
RPR SER QL: NONREACTIVE
T PALLIDUM AB SER QL AGGL: NONREACTIVE

## 2024-05-29 NOTE — PROGRESS NOTES
5/28/24: Patient came to education class with her fiancee, daughter and brother and sister.  Patient was given kidney transplant education teaching materials both written and verbally.  Patient and family remained attentive during class and asked very good questions concerning transplant.  Patient was independent with ambulation.  Patient signed eval consent form.     PRE-TRANSPLANT EDUCATION  Patient received education regarding the following topics as part of their pre-transplant evaluation:  The evaluation process, including:   Transplant team members and roles    Required consultations and testing   Selection criteria and suitability for transplant   Listing process and receiving an organ offer   Psychosocial and financial considerations for a successful transplant   Patient responsibilities, including the necessity of adhering to a strict medical regimen  An overview of the surgical procedure   Potential medical, surgical, and psychosocial risks to transplantation, including:   Wound infection   Pneumonia   Blood clot formation   Organ rejection, failure, and possibility of re-transplantation   Lifetime immunosuppression therapy and associated risks   Arrhythmias and cardiovascular collapse   Multi-organ system failure   Death   Depression   Post-Traumatic Stress Disorder   Generalized anxiety, issues of dependence, and feelings of guilt  Available alternatives to transplantation  Donor risk factors that could affect the success of the transplant and the health of the patient, including:   Donor age   Donor medical and social history   Condition of the organ   Risk of mireille cancer, HIV, Hepatitis B, Hepatitis C, or malaria if the infection is not detectable at the time of donation  Patient?s right to withdraw consent for transplantation at any time during the process  Transplants not performed in a Medicare-approved transplant center could affect the patient?s ability to have immunosuppression medication  paid for under Medicare part B.   Multiple listing options.    Patient was given the opportunity to have questions answered. Patient was provided a copy of the informed consent for transplant evaluation.    Signed evaluation informed consent received? 5/28/24

## 2024-05-30 ENCOUNTER — TELEPHONE (OUTPATIENT)
Dept: TRANSPLANT | Facility: HOSPITAL | Age: 44
End: 2024-05-30

## 2024-05-30 LAB
AMPHETAMINES SERPL QL SCN: NEGATIVE NG/ML
ANNOTATION COMMENT IMP: NORMAL
BARBITURATES SERPL QL SCN: NEGATIVE NG/ML
BENZODIAZ SERPL QL SCN: NEGATIVE NG/ML
BUPRENORPHINE SERPL-MCNC: NEGATIVE NG/ML
CANNABINOIDS SERPL QL SCN: POSITIVE NG/ML
COCAINE SERPL QL SCN: NEGATIVE NG/ML
METHADONE SERPL QL SCN: NEGATIVE NG/ML
METHAMPHET SERPL QL: NEGATIVE NG/ML
NIL(NEG) CONTROL SPOT COUNT: NORMAL
OPIATES SERPL QL SCN: NEGATIVE NG/ML
OXYCODONE SERPL QL: NEGATIVE NG/ML
PANEL A SPOT COUNT: 0
PANEL B SPOT COUNT: 1
PCP SERPL QL SCN: NEGATIVE NG/ML
POS CONTROL SPOT COUNT: NORMAL
T-SPOT. TB INTERPRETATION: NEGATIVE

## 2024-05-30 ASSESSMENT — PATIENT HEALTH QUESTIONNAIRE - PHQ9
5. POOR APPETITE OR OVEREATING: NOT AT ALL
1. LITTLE INTEREST OR PLEASURE IN DOING THINGS: NOT AT ALL
SUM OF ALL RESPONSES TO PHQ QUESTIONS 1-9: 0
3. TROUBLE FALLING OR STAYING ASLEEP OR SLEEPING TOO MUCH: NOT AT ALL
6. FEELING BAD ABOUT YOURSELF - OR THAT YOU ARE A FAILURE OR HAVE LET YOURSELF OR YOUR FAMILY DOWN: NOT AT ALL
7. TROUBLE CONCENTRATING ON THINGS, SUCH AS READING THE NEWSPAPER OR WATCHING TELEVISION: NOT AT ALL
2. FEELING DOWN, DEPRESSED OR HOPELESS: NOT AT ALL
SUM OF ALL RESPONSES TO PHQ9 QUESTIONS 1 & 2: 0
8. MOVING OR SPEAKING SO SLOWLY THAT OTHER PEOPLE COULD HAVE NOTICED. OR THE OPPOSITE, BEING SO FIGETY OR RESTLESS THAT YOU HAVE BEEN MOVING AROUND A LOT MORE THAN USUAL: NOT AT ALL
9. THOUGHTS THAT YOU WOULD BE BETTER OFF DEAD, OR OF HURTING YOURSELF: NOT AT ALL
4. FEELING TIRED OR HAVING LITTLE ENERGY: NOT AT ALL

## 2024-05-30 ASSESSMENT — ANXIETY QUESTIONNAIRES
6. BECOMING EASILY ANNOYED OR IRRITABLE: SEVERAL DAYS
IF YOU CHECKED OFF ANY PROBLEMS ON THIS QUESTIONNAIRE, HOW DIFFICULT HAVE THESE PROBLEMS MADE IT FOR YOU TO DO YOUR WORK, TAKE CARE OF THINGS AT HOME, OR GET ALONG WITH OTHER PEOPLE: SOMEWHAT DIFFICULT
GAD7 TOTAL SCORE: 1
2. NOT BEING ABLE TO STOP OR CONTROL WORRYING: NOT AT ALL
3. WORRYING TOO MUCH ABOUT DIFFERENT THINGS: NOT AT ALL
4. TROUBLE RELAXING: NOT AT ALL
1. FEELING NERVOUS, ANXIOUS, OR ON EDGE: NOT AT ALL
7. FEELING AFRAID AS IF SOMETHING AWFUL MIGHT HAPPEN: NOT AT ALL
5. BEING SO RESTLESS THAT IT IS HARD TO SIT STILL: NOT AT ALL

## 2024-06-02 LAB
COTININE SERPL-MCNC: 47 NG/ML
NICOTINE SERPL-MCNC: <5 NG/ML

## 2024-06-05 LAB — CANNABINOIDS SERPL-MCNC: <5 NG/ML

## 2024-06-06 LAB
HLA CLASS I AB SCREEN,FC: NORMAL
HLA CLASS II AB SCREEN,FC: NORMAL
HLA CLS I TYP PNL BLD/T DONR HIGH RES: NORMAL
HLA RESULTS: NORMAL
HLA RESULTS: NORMAL
HLA-DP2 QL: NORMAL
HLA-DQB1 HIGH RES: NORMAL
HLA-DRB1 HIGH RES: NORMAL

## 2024-06-11 ENCOUNTER — DOCUMENTATION (OUTPATIENT)
Dept: TRANSPLANT | Facility: HOSPITAL | Age: 44
End: 2024-06-11
Payer: COMMERCIAL

## 2024-06-11 NOTE — PROGRESS NOTES
Per results review pt has positive RPR.    Called pt to let her know she would need follow up for this.  I faxed the results to her PCP's office for follow up.    Called Dr. Bridget Escalante's office - phone [phone], fax [phone]    Faxed result to the office, they are aware pt will need follow up for it.

## 2024-06-25 ENCOUNTER — TELEPHONE (OUTPATIENT)
Dept: TRANSPLANT | Facility: HOSPITAL | Age: 44
End: 2024-06-25
Payer: COMMERCIAL

## 2024-06-27 ENCOUNTER — TELEPHONE (OUTPATIENT)
Dept: TRANSPLANT | Facility: HOSPITAL | Age: 44
End: 2024-06-27
Payer: COMMERCIAL

## 2024-07-08 ENCOUNTER — DOCUMENTATION (OUTPATIENT)
Dept: TRANSPLANT | Facility: HOSPITAL | Age: 44
End: 2024-07-08
Payer: COMMERCIAL

## 2024-07-08 RX ORDER — POLYETHYLENE GLYCOL 3350 17 G/17G
17 POWDER, FOR SOLUTION ORAL DAILY
COMMUNITY

## 2024-07-08 RX ORDER — FLUTICASONE PROPIONATE 50 MCG
1 SPRAY, SUSPENSION (ML) NASAL DAILY
COMMUNITY

## 2024-07-08 RX ORDER — ASPIRIN 325 MG
325 TABLET ORAL DAILY
COMMUNITY

## 2024-07-08 RX ORDER — LORATADINE 10 MG
10 TABLET,DISINTEGRATING ORAL DAILY
COMMUNITY

## 2024-07-08 RX ORDER — DOCUSATE SODIUM 100 MG/1
100 CAPSULE, LIQUID FILLED ORAL 2 TIMES DAILY
COMMUNITY

## 2024-07-08 RX ORDER — SEVELAMER CARBONATE 800 MG/1
800 TABLET, FILM COATED ORAL
COMMUNITY

## 2024-07-08 RX ORDER — NIFEDIPINE 60 MG/1
60 TABLET, FILM COATED, EXTENDED RELEASE ORAL
COMMUNITY

## 2024-07-08 RX ORDER — CARVEDILOL 25 MG/1
25 TABLET ORAL 2 TIMES DAILY
COMMUNITY

## 2024-07-08 RX ORDER — SUCROFERRIC OXYHYDROXIDE 500 MG/1
500 TABLET, CHEWABLE ORAL 3 TIMES DAILY
COMMUNITY

## 2024-07-08 RX ORDER — CLOPIDOGREL BISULFATE 75 MG/1
75 TABLET ORAL DAILY
COMMUNITY

## 2024-07-23 ENCOUNTER — DOCUMENTATION (OUTPATIENT)
Dept: TRANSPLANT | Facility: HOSPITAL | Age: 44
End: 2024-07-23
Payer: MEDICARE

## 2024-07-23 NOTE — PROGRESS NOTES
Spoke to Conchita at [facility] ph [phone] approved eval eff 05/28/2024 to 12/31/2024. REF # [id number].

## 2024-07-24 ENCOUNTER — APPOINTMENT (OUTPATIENT)
Dept: TRANSPLANT | Facility: HOSPITAL | Age: 44
End: 2024-07-24
Payer: COMMERCIAL

## 2024-07-29 NOTE — PROGRESS NOTES
Reason for Nutrition Visit:  Pt is a 44 y.o. female referred for initial MNT. Pt is being evaluated  for kidney transplant.     Past Medical Hx:  There is no problem list on file for this patient.       Food and Nutrition Hx:  Pt reports appetite is good/fair, eating about 75% meals. She reports eating most of her meals at home but does occasionally eat out time to time. She reports being on dialysis since 2014 and tries to follow her dialysis diet, sees a dialysis dietitian at her center. Pt reports she occasionally salts certain foods like her corn but overall doesn't salt food.   24 Diet Recall:  Meal 1: nothing, Nepro protein drink, leonard crackers   Meal 2: 2:00 pm, turkey or chicken sandwich, fruit, chips   Meal 3: 7:00 pm, turkey meatloaf, grave mashed potatoes and corn, baked chicken, mashed potatoes, and corn,   Snacks: peanuts, peanut butter, peanut butter cookies   Beverages: water, diet sunkiss orange (watches her fluids)    WEIGHT HISTORY  CW: 159# (72.5 kg), 162# (4/4/24), pt reports normal weight for her.  BMI: 26.59 kg  Dry Weight: 72.5 kg   Weight change:  No  Significant Weight Change: No    Lab Results   Component Value Date    HGBA1C 4.9 05/28/2024    CHOL 138 05/28/2024    BUN 36 (H) 05/28/2024    PHOS 4.9 05/28/2024          Food Preparation: Children  Cooking Skills/Barriers: None reported  Grocery Shopping: Patient and Children        Allergies: green peppers  Intolerance: None  Appetite: Good  Intake: >75%  GI Symptoms : None   Swallowing Difficulty: No problems with swallowing  Dentition : own    Eating Out Type: Fast Food, Restaurant, and Take Out constant, last 2 months no more   Convenience Foods: Frozen Meals and Canned Soup  2 times per week    Types of Activities: treadmill  Duration: <30 minutes every 2 days    Sleep duration/quality : 7+ hours and continuous sleep    Supplements: Probiotic daily      Nutrition Focused Physical Exam:    Performed/Deferred: Deferred due to be being  virtual visit    Malnutrition Present: No    Estimated Energy Needs:    Weight Maintanence: 25-28 kcal/kg/day and 1.2-1.3 g/pro/kg/day    Estimated Needs: 5017-0353 kcal for weight maintenance, 87-94  grams of  protein, and per MD fluid    Nutrition Diagnosis:  No nutrition diagnosis    Nutrition Interventions  Explained Diet For Dialysis Diet:  Sodium: We reviewed the importance and compliance with a 2 gm sodium diet. Recommend decreasing high sodium foods such as soups, gravies, regular deli meats, condiments, prepackaged foods, crackers, chips.  One teaspoon of salt contain more than 2000 mg of sodium.  When reviewing a food label, choose foods than have less than 20% of the daily value of sodium. Dicussed with pt limiting amount of eating out and to avoid adding salt to his food.   Phosphorous: The kidneys mainly control the balance of phosphorus in the body. When phosphorus builds up in the body, it  causes calcium to come out of your bones. This can lead to weak bones that can be painful and break easily.  Too much phosphorus in your blood can also lead to heart disease. Went over food choices high/low in phosphorus. Pt recent lab value within range.   Fluid: Drink fluids to keep hydrated. Water is best. Dicussed that her fluid needs are about 32 oz per day.   Potassium: The muscles and nerves in your body use potassium to function. Too much or too little potassium can  prevent your heart muscle from working properly. If you have been told to limit your potassium intake, you will want to be especially careful about eating fruits and vegetables that are high in  potassium. Reviewed foods high/low in potassium.   Protein : You need a lot of protein! Choose 2 to 3 palm-sized  portions of protein foods each day. These include  2-3 eggs or egg whites, fresh lean beef, lamb, veal,  wild game, and “all natural” chicken, fish, pork,  seafood, or turkey. Beans, edamame, lentils, nut  butters, or tofu may be good  options in meatless  meals. Limit salty processed meats such as ramirez,  brats, deli meats, ham, hot dogs, and sausage.    Nutrition Goals:  Nutrition Goals : Compliance with Dialysis diet    Educational Handouts: Nutrition Guide for CKD

## 2024-07-30 ENCOUNTER — NUTRITION (OUTPATIENT)
Dept: TRANSPLANT | Facility: HOSPITAL | Age: 44
End: 2024-07-30
Payer: MEDICARE

## 2024-07-30 ENCOUNTER — APPOINTMENT (OUTPATIENT)
Dept: TRANSPLANT | Facility: HOSPITAL | Age: 44
End: 2024-07-30
Payer: MEDICARE

## 2024-12-10 ENCOUNTER — DOCUMENTATION (OUTPATIENT)
Dept: TRANSPLANT | Facility: HOSPITAL | Age: 44
End: 2024-12-10
Payer: MEDICARE

## 2024-12-10 NOTE — PROGRESS NOTES
Spoke to Conchita at [facility] ph [phone] approved eval eff 05/28/2024 to 12/31/2024. REF # [id number].   TB  Edwin Duvall  5/24/2024 11:05 AM  Faxed PA request to [facility] [phone]  [facility] Medicare/Medicaid policy from [place] ([facility]. Called [facility] and submitted PA request. Received email directing me to another unit to complete PA for eval.  [facility] Link. Spoke to pt regarding prior auth for [facility]. She will come for clinic appt 05/28/2024. Medicare plan so she should not be billed for pre-transplant services.

## 2024-12-18 ENCOUNTER — DOCUMENTATION (OUTPATIENT)
Dept: TRANSPLANT | Facility: HOSPITAL | Age: 44
End: 2024-12-18
Payer: COMMERCIAL

## 2024-12-18 NOTE — PROGRESS NOTES
Kidney Patient Summary    Date of appointment: 2024    Name: Valente Short    : 1980    MRN: [id number]    Diagnosis: Diabetes Mellitus - Type II    ABO: B     Phase: Evaluation  Status: Active    Center Waitlist Date:       Last Seen: 2024  Referring Nephrologist:   Bobbi Syed    HD Unit: [facility]  Dialysis Start: 2014  Access:   LUE AVG   Days:      PCP: No primary care provider on file.       Endocrinologist:     BMI Readings from Last 1 Encounters:   24 26.59 kg/m²     Blood Transfusion:    Medical History/Hospitalizations: No past medical history on file.    Surgical History: No past surgical history on file.  Donors: No    Staff:  Nephrologist: Barbara   Surgeon: Joshua    : Milena     Testing Date:     Serologies:    CMV:     Reactive (A; Ref range: Nonreactive)  EBV Panel:  ALEX-GANN VCA IGG:   Positive   ALEX-GANN VCA IGM: Negative   EBV EARLY ANTIGEN ANTIBODY, IGG: Negative   EPSTIEN-GANN NUCLEAR ANTIGEN AB: Positive   Tox:    AMPHETAMINE SCREEN BLOOD: Negative   METHAMPHETAMINE, BLOOD, SCREEN: Negative   BENZODIAZEPINES SCREEN BLOOD: Negative   BUPRENORPHINE SCREEN BLOOD: Negative   CANNABINOIDS SCREEN BLOOD: Positive   COCAINE SCREEN BLOOD: Negative   METHADONE SCREEN BLOOD: Negative   OXYCODONE SCREEN BLOOD: Negative   PHENCYCLIDINE SCREEN BLOOD: Negative   OPIATE SCREEN BLOOD: Negative   DRUG SCREEN COMMENT BLOOD: See Note   BARBITURATE SCREEN BLOOD: Negative   Cotinine: <5; 47  HBV ag:   Nonreactive  HBV ab:   >1,000.0  HBV c:     Nonreactive  HCV:        Nonreactive  HIV:    Nonreactive  RPR:    Reactive  TSpot:   Negative   VZV:   VARICELLA ZOSTER IGG: Positive   VARICELLA ZOSTER IGG INDEX: 6.2   A1C:       HEMOGLOBIN A1C/HEMOGLOBIN TOTAL IN BLOOD: 4.9   ESTIMATED AVERAGE GLUCOSE FOR HBA1C: 94   CPep:  8.5  PSA:    No results found for requested labs within last 365 days.  Other:     Test/Consult Impression Next  Scheduled Date   CXR No results found.     EKG No results found for this or any previous visit from the past 1095 days.      Echo No results found.     CT Cardiac Score No results found.     NM Stress Test No results found.     Cardiac MRI No results found.     Left Heart Catheterization No results found.     Cardiology last visit impression      Pulmonary Function Test No results found for this or any previous visit.    CT Abd/Pelvis No results found.     Colonoscopy  No orders found for this or any previous visit.    Pap No results found for requested labs within last 1825 days.  No results found for requested labs within last 1825 days.    Mammogram No results found.     Other:

## 2025-02-27 ENCOUNTER — DOCUMENTATION (OUTPATIENT)
Dept: TRANSPLANT | Facility: HOSPITAL | Age: 45
End: 2025-02-27
Payer: COMMERCIAL

## 2025-02-27 NOTE — PROGRESS NOTES
Insurance is out of [facility] dual plan from [place]. Eval auth  2024. If pt plans to continue eval at [facility] we will need updated eval auth. Last seen 2024.

## 2025-03-04 ENCOUNTER — DOCUMENTATION (OUTPATIENT)
Facility: HOSPITAL | Age: 45
End: 2025-03-04
Payer: COMMERCIAL

## 2025-03-04 NOTE — PROGRESS NOTES
Per FC, patient's auth  for eval.  Attempted to reach pt regarding this and to assess interest in continuing evauluation, VM left with contact info.

## 2025-03-06 ENCOUNTER — COMMITTEE REVIEW (OUTPATIENT)
Facility: HOSPITAL | Age: 45
End: 2025-03-06
Payer: COMMERCIAL

## 2025-03-06 NOTE — PROGRESS NOTES
LE 3/4: Eval 2024.  No contact at our center since.  Lives in [place], and per FC, auth has .  No response to VM left for pt.  Discussed with Dr. Portillo.  Recommendation to close evaluation due to no contact from patient and no current insurance information.

## 2025-03-14 NOTE — COMMITTEE REVIEW
Evaluation Date: 5/28/2024   Committee Review Date: 3/6/2025   Organ being evaluated for: Kidney     Transplant Phase:  Evaluation   Transplant Status: Active     Referring Physician:     Transplant Physician:       Primary Diagnosis:      Committee Members:   Joselyn Villalobos RDN, LD   FinancEdwin Miramontes; Deysi Alcantara   Pharmacy Lucia Irizarry, PharmD   Psychology Dolly Bowers, PhD    [facility]   Transplant Melchor Wallace; Beth Angel, BETTINA; Hallie Valdez, BETTINA; Patricia Ignacio, BETTINA; Jeanine Rush; Nano Hall RN   Transplant Nephrology Carissa Ignacio MD   Transplant Surgery Chi Portillo MD; Alejandro Lewis MD; Tameka Pop MD; Bryanna Girard MD; Kingston Pennington MD       Eligibility:  None     Relative contraindications:  Financial situation such that reliably accessing immunosuppressive medications and care would not be possible     Absolute contraindications:  None         Committee Review Decision:    The candidate's evaluation was presented and discussed at the Transplant Multidisciplinary Selection Conference. After review of the candidate's diagnosis and the evaluations of the multidisciplinary team members, the committee made the following recommendations:     Close the evaluation due to the reason below. Patient can be re-referred once the condition is resolved.    Other: insurance and unable to reach    Resolution: Close evaluation due to no contact from patient and no current insurance information.

## 2025-08-25 ENCOUNTER — DOCUMENTATION (OUTPATIENT)
Dept: TRANSPLANT | Facility: HOSPITAL | Age: 45
End: 2025-08-25
Payer: MEDICARE